# Patient Record
Sex: MALE | Race: OTHER
[De-identification: names, ages, dates, MRNs, and addresses within clinical notes are randomized per-mention and may not be internally consistent; named-entity substitution may affect disease eponyms.]

---

## 2024-06-11 ENCOUNTER — APPOINTMENT (OUTPATIENT)
Dept: ORTHOPEDIC SURGERY | Facility: CLINIC | Age: 34
End: 2024-06-11
Payer: MEDICAID

## 2024-06-11 VITALS — BODY MASS INDEX: 30.36 KG/M2 | WEIGHT: 205 LBS | HEIGHT: 69 IN

## 2024-06-11 DIAGNOSIS — S99.922A UNSPECIFIED INJURY OF LEFT FOOT, INITIAL ENCOUNTER: ICD-10-CM

## 2024-06-11 DIAGNOSIS — E78.00 PURE HYPERCHOLESTEROLEMIA, UNSPECIFIED: ICD-10-CM

## 2024-06-11 DIAGNOSIS — M84.375A STRESS FRACTURE, LEFT FOOT, INITIAL ENCOUNTER FOR FRACTURE: ICD-10-CM

## 2024-06-11 DIAGNOSIS — Z78.9 OTHER SPECIFIED HEALTH STATUS: ICD-10-CM

## 2024-06-11 PROBLEM — Z00.00 ENCOUNTER FOR PREVENTIVE HEALTH EXAMINATION: Status: ACTIVE | Noted: 2024-06-11

## 2024-06-11 PROCEDURE — 73650 X-RAY EXAM OF HEEL: CPT | Mod: 59,LT

## 2024-06-11 PROCEDURE — 99203 OFFICE O/P NEW LOW 30 MIN: CPT

## 2024-06-11 PROCEDURE — 73630 X-RAY EXAM OF FOOT: CPT | Mod: LT

## 2024-06-11 NOTE — HISTORY OF PRESENT ILLNESS
[8] : 8 [0] : 0 [Sharp] : sharp [Stabbing] : stabbing [Intermittent] : intermittent [Work] : work [Massage] : massage [Walking] : walking [Full time] : Work status: full time [de-identified] : 06/11/2024: Patient is a 34-year-old male presenting for evaluation of left heel pain that has been ongoing over the last 3 months without specific injury.  Of note, he is a avid distance runner and is currently trying to train for [facility] marathon.  He did 2 half marathons earlier this spring where he noticed to have pain while running.  He now has pain with walking barefoot and initially in the morning with first few steps.  Pain improved some during the day and then worsens again in the evening.  He has not tried any medications for the symptoms.  He denies any weakness or paresthesias       [] : no [FreeTextEntry1] : left foot [de-identified] :

## 2024-06-11 NOTE — ASSESSMENT
[FreeTextEntry1] : 3 months of left heel pain in an avid distance runner without specific injury.  Pain to palpation over the medial calcaneus.  Due to running history and presentation of pain, concern for possible stress reaction versus developing stress fracture of the calcaneus.  X-rays obtained without evidence of obvious acute or degenerative changes - Recommend use of cam boot for immobilization.  If pain persists would likely need crutches - MRI left ankle to evaluate for calcaneal stress reaction - Recommend rest from running - Use of Tylenol, ice, anti-inflammatories as needed - Follow-up after MRI

## 2024-06-11 NOTE — PHYSICAL EXAM
[de-identified] : Constitutional: Well developed, well nourished, able to communicate Neuro: Normal sensation, No focal deficits Skin: Intact CV: Peripheral vascular exam grossly normal Pulm: No signs of respiratory distress Psych: Oriented, normal mood and affect

## 2024-06-11 NOTE — IMAGING
[de-identified] : L ankle/foot: -Pes planus -Pain to palpation over the medial calcaneus -No significant pain to palpation over the plantar fascia origin or insertion - No pain with palpation of achilles, medial or lateral malleoli, base of 5th metatarsal, navicular, metatarsals, or digits - ROM intact throughout ankle dorsiflexion, plantarflexion, inversion, eversion. Toes with normal flexion and extension. - 5/5 strength throughout - Negative anterior drawer, Kleigers, talar tilt, ankle squeeze test -Positive calcaneal squeeze - Distally neurovascularly intact   Rankle/foot: - No obvious ankle or foot deformity - No pain with palpation of achilles, medial or lateral malleoli, base of 5th metatarsal, navicular, metatarsals, or digits - ROM intact throughout ankle dorsiflexion, plantarflexion, inversion, eversion. toes with normal flexion and extension. - 5/5 strength throughout - Distally neurovascularly intact [Left] : left foot [There are no fractures, subluxations or dislocations. No significant abnormalities are seen] : There are no fractures, subluxations or dislocations. No significant abnormalities are seen [de-identified] : Questionable cortical irregularity on the medial cortex of the calcaneus without signs of displacement

## 2024-06-12 ENCOUNTER — APPOINTMENT (OUTPATIENT)
Dept: MRI IMAGING | Facility: CLINIC | Age: 34
End: 2024-06-12
Payer: MEDICAID

## 2024-06-12 PROCEDURE — 73721 MRI JNT OF LWR EXTRE W/O DYE: CPT | Mod: LT

## 2024-07-02 ENCOUNTER — APPOINTMENT (OUTPATIENT)
Dept: ORTHOPEDIC SURGERY | Facility: CLINIC | Age: 34
End: 2024-07-02

## 2024-07-10 ENCOUNTER — APPOINTMENT (OUTPATIENT)
Dept: ORTHOPEDIC SURGERY | Facility: CLINIC | Age: 34
End: 2024-07-10